# Patient Record
Sex: FEMALE
[De-identification: names, ages, dates, MRNs, and addresses within clinical notes are randomized per-mention and may not be internally consistent; named-entity substitution may affect disease eponyms.]

---

## 2019-02-25 ENCOUNTER — HOSPITAL ENCOUNTER (OUTPATIENT)
Dept: HOSPITAL 92 - RAD | Age: 39
Discharge: HOME | End: 2019-02-25
Attending: ANESTHESIOLOGY
Payer: COMMERCIAL

## 2019-02-25 DIAGNOSIS — M43.17: ICD-10-CM

## 2019-02-25 DIAGNOSIS — M43.06: Primary | ICD-10-CM

## 2019-02-25 PROCEDURE — 72120 X-RAY BEND ONLY L-S SPINE: CPT

## 2019-02-25 NOTE — RAD
FOUR VIEWS LUMBAR SPINE INCLUDING FLEXION AND EXTENSION VIEWS:

 

DATE: 2/25/2019.

 

HISTORY: 

Lumbar spondylolysis, spondylolisthesis.

 

FINDINGS: 

There are 5 non-rib-bearing lumbar-type vertebral bodies.  There is a suggestion of bilateral pars de
fects at L5 with trace anterolisthesis of L5 on S1.  No abnormal translational motion seen between th
e flexion and extension views.  The vertebral body heights are within normal limits.  No fracture is 
identified.

 

IMPRESSION: 

Spondylolisthesis lumbosacral junction with trace anterolisthesis of L5 on S1.  No abnormal translati
onal motion is present.

 

POS: [facility]JA[facility]